# Patient Record
Sex: FEMALE | Race: WHITE | NOT HISPANIC OR LATINO | Employment: PART TIME | URBAN - METROPOLITAN AREA
[De-identification: names, ages, dates, MRNs, and addresses within clinical notes are randomized per-mention and may not be internally consistent; named-entity substitution may affect disease eponyms.]

---

## 2017-05-09 ENCOUNTER — APPOINTMENT (EMERGENCY)
Dept: RADIOLOGY | Facility: HOSPITAL | Age: 52
End: 2017-05-09
Payer: COMMERCIAL

## 2017-05-09 ENCOUNTER — HOSPITAL ENCOUNTER (EMERGENCY)
Facility: HOSPITAL | Age: 52
Discharge: HOME | End: 2017-05-09
Admitting: EMERGENCY MEDICINE
Payer: COMMERCIAL

## 2017-05-09 VITALS
SYSTOLIC BLOOD PRESSURE: 117 MMHG | WEIGHT: 142 LBS | RESPIRATION RATE: 16 BRPM | HEIGHT: 65 IN | OXYGEN SATURATION: 98 % | BODY MASS INDEX: 23.66 KG/M2 | HEART RATE: 73 BPM | DIASTOLIC BLOOD PRESSURE: 61 MMHG | TEMPERATURE: 97.9 F

## 2017-05-09 DIAGNOSIS — M94.0 COSTOCHONDRITIS: Primary | ICD-10-CM

## 2017-05-09 PROCEDURE — 99283 EMERGENCY DEPT VISIT LOW MDM: CPT

## 2017-05-09 PROCEDURE — 71020 HB CHEST X-RAY 2VW FRONTAL&LATL: CPT

## 2017-05-09 PROCEDURE — 93005 ELECTROCARDIOGRAM TRACING: CPT | Performed by: PHYSICIAN ASSISTANT

## 2017-05-09 RX ORDER — BUPROPION HYDROCHLORIDE 300 MG/1
300 TABLET ORAL DAILY
COMMUNITY

## 2017-05-09 RX ORDER — ALPRAZOLAM 0.25 MG/1
0.25 TABLET ORAL
COMMUNITY

## 2017-05-09 RX ORDER — PREDNISONE 20 MG/1
60 TABLET ORAL ONCE
Status: COMPLETED | OUTPATIENT
Start: 2017-05-09 | End: 2017-05-09

## 2017-05-09 RX ORDER — PREDNISONE 20 MG/1
40 TABLET ORAL DAILY
Qty: 8 TABLET | Refills: 0 | Status: SHIPPED | OUTPATIENT
Start: 2017-05-09 | End: 2017-05-13

## 2017-05-09 RX ADMIN — PREDNISONE 60 MG: 20 TABLET ORAL at 11:10

## 2017-05-10 LAB
ATRIAL RATE: 68 BPM
P AXIS: 58 DEGREES
PR INTERVAL: 156 MS
QRS AXIS: 60 DEGREES
QRSD INTERVAL: 90 MS
QT INTERVAL: 392 MS
QTC INTERVAL: 416 MS
T WAVE AXIS: 48 DEGREES
VENTRICULAR RATE: 68 BPM

## 2018-09-07 ENCOUNTER — OFFICE VISIT (OUTPATIENT)
Dept: OBGYN CLINIC | Facility: CLINIC | Age: 53
End: 2018-09-07
Payer: COMMERCIAL

## 2018-09-07 ENCOUNTER — APPOINTMENT (OUTPATIENT)
Dept: RADIOLOGY | Facility: CLINIC | Age: 53
End: 2018-09-07
Payer: COMMERCIAL

## 2018-09-07 VITALS
HEART RATE: 77 BPM | SYSTOLIC BLOOD PRESSURE: 107 MMHG | BODY MASS INDEX: 23.29 KG/M2 | DIASTOLIC BLOOD PRESSURE: 64 MMHG | WEIGHT: 139.8 LBS | HEIGHT: 65 IN

## 2018-09-07 DIAGNOSIS — M25.512 LEFT SHOULDER PAIN, UNSPECIFIED CHRONICITY: ICD-10-CM

## 2018-09-07 DIAGNOSIS — S43.402A SPRAIN OF LEFT SHOULDER, UNSPECIFIED SHOULDER SPRAIN TYPE, INITIAL ENCOUNTER: Primary | ICD-10-CM

## 2018-09-07 PROCEDURE — 73030 X-RAY EXAM OF SHOULDER: CPT

## 2018-09-07 PROCEDURE — 99203 OFFICE O/P NEW LOW 30 MIN: CPT | Performed by: ORTHOPAEDIC SURGERY

## 2018-09-07 NOTE — PROGRESS NOTES
Assessment/Plan:  1  Sprain of left shoulder, unspecified shoulder sprain type, initial encounter  XR shoulder 2+ vw left       Scribe Attestation    I,:   Ly Tiwari MA am acting as a scribe while in the presence of the attending physician :        I,:   Melo Rutledge, DO personally performed the services described in this documentation    as scribed in my presence :              Dang's signs and symptoms are consistent with a left shoulder strain  On examination today she has mild pain with 110 degrees of abduction but, otherwise a normal shoulder exam   I discussed with her to take Advil or Tylenol over the counter for any pain or discomfort  She was instructed she can follow up with me as needed for this and to return to the office if her shoulder complaints return since she is asymptomatic in the office today  All of her questions were answered in the office today and she may call the office at any time with any questions or concerns  Subjective:   Yuliya Null is a 48 y o  female who presents for evaluation of left shoulder pain  She states yesterday she was reaching down to  and object and felt a sharp throbbing  pain into her left shoulder she denies a pop at this time  She states she had loss of ROM and was unable to reach above her head  She states she has felt a "twinge" in her left shoulder for a few weeks but no pain until the episode yesterday  She states the pain is located in the anterior aspect of her left shoulder and will radiate towards her neck  She denies any pain complaints today  She denies any numbness or tingling  Review of Systems   Constitutional: Negative for chills and fever  HENT: Negative for drooling and sneezing  Eyes: Negative for redness  Respiratory: Negative for cough and wheezing  Gastrointestinal: Negative for nausea and vomiting  Musculoskeletal: Positive for arthralgias and myalgias  Negative for joint swelling     Neurological: Negative for weakness and numbness  Psychiatric/Behavioral: Negative for behavioral problems  The patient is not nervous/anxious  History reviewed  No pertinent past medical history  History reviewed  No pertinent surgical history  Family History   Problem Relation Age of Onset    No Known Problems Mother     No Known Problems Father     No Known Problems Sister     No Known Problems Brother     No Known Problems Maternal Aunt     No Known Problems Maternal Uncle     No Known Problems Paternal Aunt     No Known Problems Paternal Uncle     No Known Problems Maternal Grandmother     No Known Problems Maternal Grandfather     No Known Problems Paternal Grandmother     No Known Problems Paternal Grandfather     ADD / ADHD Neg Hx     Anesthesia problems Neg Hx     Cancer Neg Hx     Clotting disorder Neg Hx     Collagen disease Neg Hx     Diabetes Neg Hx     Dislocations Neg Hx     Learning disabilities Neg Hx     Neurological problems Neg Hx     Osteoporosis Neg Hx     Rheumatologic disease Neg Hx     Scoliosis Neg Hx     Vascular Disease Neg Hx        Social History     Occupational History    Not on file  Social History Main Topics    Smoking status: Never Smoker    Smokeless tobacco: Never Used    Alcohol use No    Drug use: No    Sexual activity: Not on file         Current Outpatient Prescriptions:     ALPRAZolam (XANAX) 0 25 mg tablet, Take 0 25 mg by mouth daily at bedtime as needed for anxiety, Disp: , Rfl:     buPROPion (WELLBUTRIN XL) 300 mg 24 hr tablet, Take 300 mg by mouth daily, Disp: , Rfl:     Allergies   Allergen Reactions    Naproxen GI Intolerance    Zithromax [Azithromycin] GI Intolerance       Objective:  Vitals:    09/07/18 1305   BP: 107/64   Pulse: 77       Left Shoulder Exam   Left shoulder exam is normal     Tenderness   The patient is experiencing no tenderness           Range of Motion   Active Abduction: 110 (mild pain)     Muscle Strength Abduction: 5/5   Internal Rotation: 5/5   External Rotation: 5/5   Supraspinatus: 5/5     Tests   Cross Arm: positive  Hawkin's test: negative  Impingement: negative    Other   Erythema: absent  Scars: absent  Sensation: normal  Pulse: present     Comments:  Negative belly press  Negative empty can             Physical Exam   Constitutional: She is oriented to person, place, and time  She appears well-developed and well-nourished  HENT:   Head: Atraumatic  Eyes: Conjunctivae are normal    Neck: Normal range of motion  Cardiovascular: Normal rate  Pulmonary/Chest: Effort normal    Musculoskeletal:   As noted in HPI   Neurological: She is alert and oriented to person, place, and time  Skin: Skin is warm and dry  Psychiatric: She has a normal mood and affect  Her behavior is normal  Judgment and thought content normal    Nursing note and vitals reviewed  I have personally reviewed pertinent films in PACS and my interpretation is as follows:X-ray left shoulder performed on 9/7/18 shows normal appearing x-ray no signs of fractures or dislocations     No lytic or blastic lesion, soft tissue is normal as well

## 2022-01-31 ENCOUNTER — HOSPITAL ENCOUNTER (OUTPATIENT)
Dept: RADIOLOGY | Facility: HOSPITAL | Age: 57
Discharge: HOME | End: 2022-01-31
Payer: COMMERCIAL

## 2022-01-31 ENCOUNTER — OFFICE VISIT (OUTPATIENT)
Dept: OBGYN CLINIC | Facility: CLINIC | Age: 57
End: 2022-01-31
Payer: COMMERCIAL

## 2022-01-31 VITALS — WEIGHT: 145 LBS | BODY MASS INDEX: 24.16 KG/M2 | HEIGHT: 65 IN

## 2022-01-31 DIAGNOSIS — M25.521 PAIN IN RIGHT ELBOW: ICD-10-CM

## 2022-01-31 DIAGNOSIS — M25.521 PAIN IN RIGHT ELBOW: Primary | ICD-10-CM

## 2022-01-31 DIAGNOSIS — M77.11 LATERAL EPICONDYLITIS OF RIGHT ELBOW: ICD-10-CM

## 2022-01-31 PROCEDURE — 99203 OFFICE O/P NEW LOW 30 MIN: CPT | Performed by: PHYSICIAN ASSISTANT

## 2022-01-31 PROCEDURE — 73080 X-RAY EXAM OF ELBOW: CPT

## 2022-01-31 NOTE — PATIENT INSTRUCTIONS
Tennis Elbow Exercises   AMBULATORY CARE:   Tennis elbow exercises  help decrease pain in your elbow, forearm, wrist, and hand  They also help strengthen your arm muscles and prevent further injury  Start these exercises slowly  Do the exercises on both arms  Stop if you feel pain  · Finger extensions:  Hold your fingers close together  Put a rubber band around the outside of your fingers and thumb  Spread your fingers apart and then slowly bring them together without letting the rubber band fall off  Repeat 40 times  · Wrist flexor stretch:  Hold your arm straight out in front of you with your palm facing down  Use your other hand to grasp your fingers  Keep your elbow straight and slowly bend your hand back  Your fingertips should point up and your palm should face away from you  Do this until you feel a stretch in the top of your wrist  Hold for 10 seconds  Repeat 5 times  · Wrist extensor stretch: This stretch is the opposite of the wrist flexor stretch  Hold your arm straight out in front of you with your palm facing down  Use your other hand to grasp your fingers  Keep your elbow straight and slowly bend your hand down  Your fingertips should point down and your palm should face you  Do this until you feel a stretch in your wrist  Hold for 10 seconds  Repeat 5 times  · Bicep curls:  Place your hand under your injured elbow for support  Turn your palm so that it faces up and hold a weight in your hand  Ask your healthcare provider how much weight you should use  Slowly bend and straighten your elbow 30 times  · :  Hold a soft rubber ball or tennis ball in your hand  Squeeze the ball as hard as you can and hold this position  Ask your healthcare provider how long to hold this position  Repeat this exercise as directed by your healthcare provider  Contact your healthcare provider if:   · You have increased pain or weakness in your arm, wrist, hand, or fingers      · You have new numbness or tingling in your arm, hand, or fingers  · You have questions or concerns about tennis elbow exercises  © Copyright [employer] 2021 Information is for End User's use only and may not be sold, redistributed or otherwise used for commercial purposes  All illustrations and images included in CareNotes® are the copyrighted property of A D A M , Inc  or Aidan Garner   The above information is an  only  It is not intended as medical advice for individual conditions or treatments  Talk to your doctor, nurse or pharmacist before following any medical regimen to see if it is safe and effective for you  Diclofenac (On the skin)   Diclofenac (dye-KLOE-fen-ak)  Treats actinic keratoses  Also treats pain and swelling caused by arthritis, including osteoarthritis of the knee  This is an NSAID  Brand Name(s): Aspercreme Arthritis Pain Reliever, Diclo Gel, Diclofono, Diclozor, Klofensaid II, Leader Arthritis Pain Reliever, Lexixryl, Pennsaid, Solaravix, Solaraze, Voltaren Gel, Xrylix   There may be other brand names for this medicine  When This Medicine Should Not Be Used: This medicine is not right for everyone  Do not use it if you had an allergic reaction to diclofenac, aspirin or another NSAID medication  How to Use This Medicine:   Gel/Jelly, Liquid  · Use your medicine as directed  There are several brands of this medicine  Make sure you understand how to use the brand you have been prescribed  Ask your doctor if you have any questions  · The diclofenac 1% gel comes with a dosing card to measure the correct dose  If you do not receive or misplace your dosing card, call your pharmacist to ask for a new one  · Voltaren® gel: Follow the instructions on the medicine label if you are using this medicine without a prescription  · Use this medicine only on your skin  Rinse it off right away if it gets on a cut or scrape   Do not get the medicine in your eyes, nose, or mouth   · Wash your hands with soap and water before and after you use this medicine  · Apply a thin layer of the medicine to the affected area  Rub it in gently  · Do not shower, bathe, or wash the affected area for at least 30 minutes after you apply Pennsaid® or Solaraze® or 1 hour after you apply Voltaren®  · Wait until the medicine dries before you cover the treated skin with gloves or clothing  Do not let the treated skin touch any other person's skin until the medicine is completely dry  · Do not use external heat or bandages on the treated skin or joint  · This medicine should come with a Medication Guide  Ask your pharmacist for a copy if you do not have one  · Missed dose: Apply a dose as soon as you can  If it is almost time for your next dose, wait until then and apply a regular dose  Do not apply extra medicine to make up for a missed dose  · Store the medicine in a closed container at room temperature, away from heat, moisture, and direct light  Drugs and Foods to Avoid:   Ask your doctor or pharmacist before using any other medicine, including over-the-counter medicines, vitamins, and herbal products  · Do not use any other NSAID unless your doctor says it is okay  Some other NSAIDs are aspirin, diflunisal, ibuprofen, naproxen, or salsalate  · Some medicines can affect how diclofenac works  Tell your doctor if you are using any of the following:  ? Acetaminophen, cyclosporine, digoxin, lithium, methotrexate, pemetrexed  ? Blood pressure medicine (including ACE inhibitors, ARBs, beta blockers)  ? Blood thinner (including warfarin)  ? Diuretic (water pill)  ? Medicine to treat depression (including SNRIs, SSRIs)  ? Steroids (including dexamethasone, hydrocortisone, methylprednisolone, prednisolone, prednisone)  · Do not put cosmetics or skin care products on the treated skin  You may use sunscreen, insect repellant, lotion, or other topical medicines after using Pennsaid®   However, wait until the medicine is completely dry before you apply anything else  Warnings While Using This Medicine:   · Tell your doctor if you are pregnant  Do not use this medicine during the later part of a pregnancy, unless your doctor tells you to  · Tell your doctor if you are breastfeeding, or if you have kidney disease, liver disease, asthma, bleeding problems, heart failure, high blood pressure, other heart or blood vessel problems, a recent heart attack, or a history of stomach ulcers or bleeding  Tell your doctor if you drink alcohol  · This medicine may cause the following problems:  ? Higher risk of blood clot, heart attack, heart failure, or stroke  ? Stomach or bowel problems (including bleeding, ulcers, or perforation)  ? Liver problems  ? High blood pressure  ? Kidney problems  ? Serious skin reactions, including Hobson-Jasson syndrome, exfoliative dermatitis, toxic epidermal necrolysis, and drug reaction with eosinophilia and systemic symptoms (DRESS)  · This medicine may cause a delay in ovulation for women and may affect their ability to have children  If you plan to have children, talk with your doctor before using this medicine  · This medicine may make your skin more sensitive to sunlight  Wear sunscreen  Do not use sunlamps or tanning beds  · Your doctor will do lab tests at regular visits to check on the effects of this medicine  Keep all appointments  · Keep all medicine out of the reach of children  Never share your medicine with anyone    Possible Side Effects While Using This Medicine:   Call your doctor right away if you notice any of these side effects:  · Allergic reaction: Itching or hives, swelling in your face or hands, swelling or tingling in your mouth or throat, chest tightness, trouble breathing  · Blistering, peeling, or red skin rash  · Bloody or black, tarry stools, severe stomach pain, vomiting blood or material that looks like coffee grounds  · Change in how much or how often you urinate  · Chest pain that may spread to your arms, jaw, back, or neck, trouble breathing, unusual sweating, faintness  · Dark urine or pale stools, nausea, vomiting, loss of appetite, stomach pain, yellow skin or eyes  · Numbness or weakness in your arm or leg, or on one side of your body, pain in your lower leg, sudden or severe headache, or problems with vision, speech, or walking  · Rapid weight gain, swelling in your hands, ankles, or feet  · Unusual bleeding, bruising, or weakness  If you notice these less serious side effects, talk with your doctor:   · Dry, flaky, or scaly skin  · Mild headache  · Mild skin rash, itching, or redness  If you notice other side effects that you think are caused by this medicine, tell your doctor  Call your doctor for medical advice about side effects  You may report side effects to FDA at 2-094-FDA-7494    © Copyright [employer] 2021 Information is for End User's use only and may not be sold, redistributed or otherwise used for commercial purposes  The above information is an  only  It is not intended as medical advice for individual conditions or treatments  Talk to your doctor, nurse or pharmacist before following any medical regimen to see if it is safe and effective for you

## 2022-01-31 NOTE — PROGRESS NOTES
Assessment/Plan   Diagnoses and all orders for this visit:    Pain in right elbow    Lateral epicondylitis of right elbow    - Ice as needed  - Forearm stretches, home program - detailed in AVS  - Voltaren gel as needed  - Tennis elbow strap  - Follow up with [facility] in 4 weeks      Subjective   Patient ID: Beauty Dakins is a 62 y o  female  Vitals:     60yo female comes in for an evaluation of her right elbow  History of RA  She has been having ongoing pain for about 2 weeks with no specific injury  The pain is medial and lateral   It comes on when she tries to pick something up, such a a full drink  Yesterday at [employer] she had to switch hands to drink her drink  The pain resolves completely at rest   The pain is dull in character, mild in severity, pain does not radiate and is not associated with numbness  The following portions of the patient's history were reviewed and updated as appropriate: allergies, current medications, past family history, past medical history, past social history, past surgical history and problem list     Review of Systems  Ortho Exam  History reviewed  No pertinent past medical history  History reviewed  No pertinent surgical history    Family History   Problem Relation Age of Onset    No Known Problems Mother     No Known Problems Father     No Known Problems Sister     No Known Problems Brother     No Known Problems Maternal Aunt     No Known Problems Maternal Uncle     No Known Problems Paternal Aunt     No Known Problems Paternal Uncle     No Known Problems Maternal Grandmother     No Known Problems Maternal Grandfather     No Known Problems Paternal Grandmother     No Known Problems Paternal Grandfather     ADD / ADHD Neg Hx     Anesthesia problems Neg Hx     Cancer Neg Hx     Clotting disorder Neg Hx     Collagen disease Neg Hx     Diabetes Neg Hx     Dislocations Neg Hx     Learning disabilities Neg Hx     Neurological problems Neg Hx     Osteoporosis Neg Hx     Rheumatologic disease Neg Hx     Scoliosis Neg Hx     Vascular Disease Neg Hx      Social History     Occupational History    Not on file   Tobacco Use    Smoking status: Never Smoker    Smokeless tobacco: Never Used   Vaping Use    Vaping Use: Never used   Substance and Sexual Activity    Alcohol use: No    Drug use: No    Sexual activity: Not on file       Review of Systems   Constitutional: Negative  HENT: Negative  Eyes: Negative  Respiratory: Negative  Cardiovascular: Negative  Gastrointestinal: Negative  Endocrine: Negative  Genitourinary: Negative  Musculoskeletal: As below      Allergic/Immunologic: Negative  Neurological: Negative  Hematological: Negative  Psychiatric/Behavioral: Negative  Objective   Physical Exam      I have personally reviewed pertinent films in PACS and my interpretation is no acute displaced fracture on xray  No fat pad sign       · Constitutional: Awake, Alert, Oriented  · Eyes: EOMI  · Psych: Mood and affect appropriate  · Heart: regular rate   · Lungs: No audible wheezing  · Abdomen: No guarding  · Lymph: no lymphedema       right Elbow:  - Appearance   No swelling, discoloration, deformity, or ecchymosis  - Palpation   No tenderness to palpation of the medial / lateral epicondyles, olecranon, radial head, or anterior elbow  No effusion   - ROM   Extension: 0 and Flexion: 140  Supination 90, pronation 90  - Motor   normal 5/5 in all planes  - Special Tests   + pain with resisted wrist extension    No pain with resisted wrist flexion or finger extension   - NVI distally

## 2022-02-15 ENCOUNTER — TELEPHONE (OUTPATIENT)
Dept: OBGYN CLINIC | Facility: OTHER | Age: 57
End: 2022-02-15

## 2022-02-15 NOTE — TELEPHONE ENCOUNTER
Patient called in , she is having increasing Right Elbow pain, she said she cannot use her arm  She said she was never told Discharge instructions os she is not sure what to do        C/b # [phone]

## 2022-02-16 ENCOUNTER — OFFICE VISIT (OUTPATIENT)
Dept: OBGYN CLINIC | Facility: CLINIC | Age: 57
End: 2022-02-16
Payer: COMMERCIAL

## 2022-02-16 VITALS
BODY MASS INDEX: 24.16 KG/M2 | TEMPERATURE: 97.2 F | HEART RATE: 71 BPM | SYSTOLIC BLOOD PRESSURE: 109 MMHG | DIASTOLIC BLOOD PRESSURE: 71 MMHG | WEIGHT: 145 LBS | HEIGHT: 65 IN

## 2022-02-16 DIAGNOSIS — M77.11 LATERAL EPICONDYLITIS OF RIGHT ELBOW: Primary | ICD-10-CM

## 2022-02-16 PROCEDURE — 99214 OFFICE O/P EST MOD 30 MIN: CPT | Performed by: ORTHOPAEDIC SURGERY

## 2022-02-16 RX ORDER — IBUPROFEN 600 MG/1
600 TABLET ORAL 2 TIMES DAILY WITH MEALS
COMMUNITY
Start: 2022-02-02

## 2022-02-16 RX ORDER — ALPRAZOLAM 0.5 MG/1
0.5 TABLET ORAL
COMMUNITY
Start: 2022-01-03

## 2022-02-16 NOTE — PROGRESS NOTES
Assessment/Plan:  1  Lateral epicondylitis of right elbow  Cock Up Wrist Splint     Media Horse has right-sided elbow pain consistent with lateral epicondylitis  We discussed today multiple treatment options including ice, anti-inflammatories, rest, counterforce bracing during activity and cock-up wrist splinting at night  I also recommended home exercises  We could consider formal physical therapy if her pain persists or worsens  I also recommended possibility of a cortisone injection if all of these modalities do not help her  She verbalized understanding this plan will follow-up in 4-6 weeks  Subjective:   Dagmar Tom is a 62 y o  female who presents to the office for evaluation for right-sided elbow pain  She has been feeling increased discomfort in the lateral portion of her right elbow for the past 3 months  She denies any injury or trauma  She denies any new activity or new job that may have increased her pain  She did see orthopedic office in [facility] was diagnosed with lateral epicondylitis  She states she was given a counterforce brace but no other instructions  She has also been using anti-inflammatories but not icing or doing any other exercises  She cannot recall what may have exacerbated this pain  She never felt this in the past   She denies any numbness or tingling down her arm  Today she has aching throbbing pain to the lateral portion of the elbow that worsens with gripping or picking up objects  Review of Systems   Constitutional: Negative for chills, fever and unexpected weight change  HENT: Negative for hearing loss, nosebleeds and sore throat  Eyes: Negative for pain, redness and visual disturbance  Respiratory: Negative for cough, shortness of breath and wheezing  Cardiovascular: Negative for chest pain, palpitations and leg swelling  Gastrointestinal: Negative for abdominal pain, nausea and vomiting  Endocrine: Negative for polydipsia and polyuria     Genitourinary: Negative for dysuria and hematuria  Musculoskeletal:        See HPI   Skin: Negative for rash and wound  Neurological: Negative for dizziness, numbness and headaches  Psychiatric/Behavioral: Negative for decreased concentration and suicidal ideas  The patient is not nervous/anxious  No past medical history on file  No past surgical history on file      Family History   Problem Relation Age of Onset    No Known Problems Mother     No Known Problems Father     No Known Problems Sister     No Known Problems Brother     No Known Problems Maternal Aunt     No Known Problems Maternal Uncle     No Known Problems Paternal Aunt     No Known Problems Paternal Uncle     No Known Problems Maternal Grandmother     No Known Problems Maternal Grandfather     No Known Problems Paternal Grandmother     No Known Problems Paternal Grandfather     ADD / ADHD Neg Hx     Anesthesia problems Neg Hx     Cancer Neg Hx     Clotting disorder Neg Hx     Collagen disease Neg Hx     Diabetes Neg Hx     Dislocations Neg Hx     Learning disabilities Neg Hx     Neurological problems Neg Hx     Osteoporosis Neg Hx     Rheumatologic disease Neg Hx     Scoliosis Neg Hx     Vascular Disease Neg Hx        Social History     Occupational History    Not on file   Tobacco Use    Smoking status: Never Smoker    Smokeless tobacco: Never Used   Vaping Use    Vaping Use: Never used   Substance and Sexual Activity    Alcohol use: No    Drug use: No    Sexual activity: Not on file         Current Outpatient Medications:     ALPRAZolam (XANAX) 0 5 mg tablet, Take 0 5 mg by mouth daily at bedtime, Disp: , Rfl:     buPROPion (WELLBUTRIN XL) 300 mg 24 hr tablet, Take 300 mg by mouth daily, Disp: , Rfl:     ibuprofen (MOTRIN) 600 mg tablet, Take 600 mg by mouth 2 (two) times a day with meals, Disp: , Rfl:     ALPRAZolam (XANAX) 0 25 mg tablet, Take 0 25 mg by mouth daily at bedtime as needed for anxiety (Patient not taking: Reported on 2/16/2022 ), Disp: , Rfl:     Allergies   Allergen Reactions    Naproxen GI Intolerance    Zithromax [Azithromycin] GI Intolerance       Objective:  Vitals:    02/16/22 1028   BP: 109/71   Pulse: 71   Temp: (!) 97 2 °F (36 2 °C)       Right Elbow Exam     Tenderness   The patient is experiencing tenderness in the lateral epicondyle  Range of Motion   Extension: normal   Flexion: normal   Pronation: normal   Supination: normal     Tests   Varus: negative  Valgus: negative    Other   Erythema: absent  Sensation: normal  Pulse: present    Comments:  Pain with resisted wrist extension            Physical Exam  Vitals and nursing note reviewed  Constitutional:       Appearance: She is well-developed  HENT:      Head: Normocephalic and atraumatic  Eyes:      General: No scleral icterus  Conjunctiva/sclera: Conjunctivae normal    Cardiovascular:      Rate and Rhythm: Normal rate  Pulmonary:      Effort: Pulmonary effort is normal  No respiratory distress  Musculoskeletal:      Cervical back: Normal range of motion and neck supple  Comments: As noted in HPI   Skin:     General: Skin is warm and dry  Neurological:      Mental Status: She is alert and oriented to person, place, and time  Psychiatric:         Behavior: Behavior normal          I have personally reviewed pertinent films in PACS and my interpretation is as follows: Three-view x-rays of the right wrist demonstrate no evidence of acute fracture or significant degenerative change

## 2022-04-02 ENCOUNTER — TELEPHONE (OUTPATIENT)
Dept: OBGYN CLINIC | Facility: HOSPITAL | Age: 57
End: 2022-04-02

## 2022-04-02 NOTE — TELEPHONE ENCOUNTER
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

## 2022-04-04 NOTE — TELEPHONE ENCOUNTER
If she is still having pain and everything else has not helped the therapy would be the next appropriate step   Order for PT/OT placed in chart

## 2022-04-11 ENCOUNTER — TELEPHONE (OUTPATIENT)
Dept: OBGYN CLINIC | Facility: CLINIC | Age: 57
End: 2022-04-11

## 2022-04-11 ENCOUNTER — EVALUATION (OUTPATIENT)
Dept: OCCUPATIONAL THERAPY | Facility: CLINIC | Age: 57
End: 2022-04-11
Payer: COMMERCIAL

## 2022-04-11 DIAGNOSIS — M77.11 RIGHT LATERAL EPICONDYLITIS: Primary | ICD-10-CM

## 2022-04-11 PROCEDURE — 97165 OT EVAL LOW COMPLEX 30 MIN: CPT

## 2022-04-11 NOTE — PROGRESS NOTES
OT Evaluation     Today's date: 2022  Patient name: Kary Cid  : 1965  MRN: [id number]  Referring provider: Saroj Lopez DO  Dx:   Encounter Diagnosis     ICD-10-CM    1  Right lateral epicondylitis  M77 11                   Assessment  Assessment details: Patient is a 62 y o  RHD adult who presents for OT IE and treatment for right lateral epicondylitis  Patient reports no LALY  Patient reports she began to get pain about 2 months ago  Patient notes the pain is mostly induced with twisting, lifting, and writing  Patient referred by Dr Shelly Salas to initiate treatment including hand therapy  Impairments: abnormal or restricted ROM, impaired physical strength, lacks appropriate home exercise program and pain with function  Understanding of Dx/Px/POC: good   Prognosis: good    Goals  Short term goals 2-4 weeks  Establish HEP to enhance performance with ADLs  Improve active range of motion of RUE by 20  to assist with UE dressing  Achieve composite digital flexion to touch distal leigh crease for grasp on utensils  In crease  strength by 10 lbs  to enhance gripping hand tools  Long Term goals by discharge  Establish final home exercise program to enhance maximal functional level with ADLs  Achieve functional active range of motion of RUE for full return to household chores  Achieve functional strength of RUE for full return to high level ADLs            Plan  Patient would benefit from: OT eval and skilled occupational therapy  Planned modality interventions: thermotherapy: hydrocollator packs and cryotherapy  Planned therapy interventions: manual therapy, joint mobilization, strengthening, stretching, therapeutic activities, therapeutic exercise, home exercise program, graded exercise, graded activity, functional ROM exercises and flexibility  Frequency: 2x week  Duration in weeks: 10  Plan of Care beginning date: 2022  Plan of Care expiration date: 2022  Treatment plan discussed with: patient        Subjective Evaluation    History of Present Illness  Mechanism of injury: Patient is a 62 y o  RHD adult who presents for OT IE and treatment for right lateral epicondylitis  Patient reports no LALY  Patient reports she began to get pain about 2 months ago  Patient notes the pain is mostly induced with twisting, lifting, and writing  Patient referred by Dr Citlalli Alarcon to initiate treatment including hand therapy  Pain  Current pain ratin  At best pain ratin  At worst pain ratin  Quality: throbbing, tight and dull ache    Patient Goals  Patient goals for therapy: decreased pain, increased motion, increased strength, independence with ADLs/IADLs and return to work          Objective     Active Range of Motion     Left Wrist   Wrist flexion: 85 degrees   Wrist extension: 78 degrees   Radial deviation: 27 degrees   Ulnar deviation: 26 degrees     Right Wrist   Wrist flexion: 70 degrees   Wrist extension: 76 degrees   Radial deviation: 27 degrees   Ulnar deviation: 30 degrees     Strength/Myotome Testing     Left Wrist/Hand   Wrist extension: 4+     (2nd hand position)     Trial 1: 45    Trial 2: 40    Right Wrist/Hand   Wrist extension: 3     (2nd hand position)     Trial 1: 10    Trial 2: 5      Flowsheet Rows      Most Recent Value   PT/OT G-Codes    Current Score 49   Projected Score 69             Precautions:        Manuals HEP                        Ther Ex     Education on HEP and dx x5 min               STM x5 min     Active stretches wrist flex x3 10 sec    PROM elbow ext x3 10 sec    PROM wrist flex/ext x3 10 sec                                  Modalities     MHP 5 min            Assessment:   Patient tolerated session well  Patient demonstrates ROM and strength deficits upon assessment today   Patient session focused on patient education on anatomy and physiology concerning current dx, techniques for decreasing deficits through HEP, and appropriate use of modalities  Patient educated on HEP to include modalities, STM, and self stretches with verbal instructions and handouts for patient reference  Patient educated on treatment plan at this time  Patient benefiting from skilled hand therapy OT to reduce deficits to improve independence with daily activities      Plan:   Focus on ROM and pain reduction techniques to improve ability to complete daily activites with ease    POC 4/11/22 - 6/6/22

## 2022-04-11 NOTE — TELEPHONE ENCOUNTER
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

## 2022-04-12 NOTE — TELEPHONE ENCOUNTER
Patient checking on status  She states , once she knows the Dr is willing to do this, her HR will give her a form to fax to you [place]

## 2022-04-14 ENCOUNTER — OFFICE VISIT (OUTPATIENT)
Dept: OCCUPATIONAL THERAPY | Facility: CLINIC | Age: 57
End: 2022-04-14
Payer: COMMERCIAL

## 2022-04-14 DIAGNOSIS — M77.11 RIGHT LATERAL EPICONDYLITIS: Primary | ICD-10-CM

## 2022-04-14 PROCEDURE — 97032 APPL MODALITY 1+ESTIM EA 15: CPT

## 2022-04-14 PROCEDURE — 97110 THERAPEUTIC EXERCISES: CPT

## 2022-04-14 NOTE — PROGRESS NOTES
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  She reported that she can't write with a pen, button her pants, or dry her hair  She has constant pain/discomfort that is impacting her ability to successfully engage in ADLs/IADLs  Precautions:        Manuals HEP 2022                       Ther Ex     Education on HEP and dx x5 min  5 mins              STM/IASTM x5 min  10 min   Active stretches wrist flex x3 10 sec x3 10 sec   PROM elbow ext x3 10 sec x3 10 sec   PROM wrist flex/ext x3 10 sec x3 10 sec                                  Modalities     MHP 5 min  5 minutes    E-stim   10 minutes      Assessment:   Patient tolerated session well  Session focused on modalities and TE to improve ROM required for engagement in ADLs/IADLs  Patient was encouraged to incorporate modalities, STM, and self stretches into the HEP to decrease pain/discomfort and reach goals  Patient is continuing to be challenged through the use of TE to make improvements  Completed session with no reported increased pain due to exercise activities  Patient benefiting from skilled hand therapy OT to reduce deficits to improve independence with daily activities  Plan:   Focus on ROM and pain reduction techniques to improve ability to complete daily activites with ease    POC 22 - 22

## 2022-04-14 NOTE — TELEPHONE ENCOUNTER
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

## 2022-04-18 ENCOUNTER — OFFICE VISIT (OUTPATIENT)
Dept: OCCUPATIONAL THERAPY | Facility: CLINIC | Age: 57
End: 2022-04-18
Payer: COMMERCIAL

## 2022-04-18 DIAGNOSIS — M77.11 RIGHT LATERAL EPICONDYLITIS: Primary | ICD-10-CM

## 2022-04-18 PROCEDURE — 97110 THERAPEUTIC EXERCISES: CPT

## 2022-04-18 NOTE — PROGRESS NOTES
Daily Note     Today's date: 2022  Patient name: Dexter Oliver  : 1965  MRN: [id number]  Referring provider: Nan Lin DO  Dx:   Encounter Diagnosis     ICD-10-CM    1  Right lateral epicondylitis  M77 11                   Subjective: Patient notes with therapy it has been improving a little bit  Precautions:        Manuals HEP 2022                        Ther Ex     Education on HEP and dx x5 min  5 mins              STM/IASTM x5 min  10 min   Active stretches wrist flex x3 10 sec x3 10 sec   PROM elbow ext x3 10 sec x3 10 sec   PROM wrist flex/ext x3 10 sec x3 10 sec    PREs wrist ext and RD  x20 2 lbs                             Modalities     MHP 5 min  5 minutes    E-stim   10 minutes      Assessment:   Patient tolerated session well  Session focused on modalities and TE to improve ROM required for engagement in ADLs/IADLs  Patient was encouraged to incorporate modalities, STM, and self stretches into the HEP to decrease pain/discomfort and reach goals  Patient is continuing to be challenged through the use of TE to make improvements  Completed session with no reported increased pain due to exercise activities  Patient benefiting from skilled hand therapy OT to reduce deficits to improve independence with daily activities  Plan:   Focus on ROM and pain reduction techniques to improve ability to complete daily activites with ease    POC 22 - 22

## 2022-04-19 ENCOUNTER — APPOINTMENT (OUTPATIENT)
Dept: OCCUPATIONAL THERAPY | Facility: CLINIC | Age: 57
End: 2022-04-19
Payer: COMMERCIAL

## 2022-04-20 ENCOUNTER — APPOINTMENT (OUTPATIENT)
Dept: OCCUPATIONAL THERAPY | Facility: CLINIC | Age: 57
End: 2022-04-20
Payer: COMMERCIAL

## 2022-04-27 ENCOUNTER — OFFICE VISIT (OUTPATIENT)
Dept: OCCUPATIONAL THERAPY | Facility: CLINIC | Age: 57
End: 2022-04-27
Payer: COMMERCIAL

## 2022-04-27 DIAGNOSIS — M77.11 RIGHT LATERAL EPICONDYLITIS: Primary | ICD-10-CM

## 2022-04-27 PROCEDURE — 97110 THERAPEUTIC EXERCISES: CPT

## 2022-04-27 PROCEDURE — 97032 APPL MODALITY 1+ESTIM EA 15: CPT

## 2022-04-27 NOTE — PROGRESS NOTES
Daily Note     Today's date: 2022  Patient name: Waldo Rivera  : 1965  MRN: [id number]  Referring provider: Lamar Eng DO  Dx:   Encounter Diagnosis     ICD-10-CM    1  Right lateral epicondylitis  M77 11        Start Time: 1640  Stop Time: 1730  Total time in clinic (min): 50 minutes    Subjective: Patient reported that she has decreased pain/discomfort and that it seems to be improving  She also reported that she was on family leave two weeks which gave her the ability and time to rest her arm  She also reported that she has pain/stiffness in the morning when she wakes up  She also has been taking less ibuprofen than she usually has been taking which is an improvement  Precautions:        Manuals HEP 2022                        Ther Ex     Education on HEP and dx x5 min  5 mins              STM/IASTM x5 min  10 min   Active stretches wrist flex x3 10 sec x5 10 sec   PROM elbow ext x3 10 sec x5 10 sec   PROM wrist flex/ext x3 10 sec x5 10 sec    PREs wrist ext and RD  x20 2 lbs                             Modalities     MHP 5 min  5 minutes    E-stim   10 minutes      Assessment:   Patient tolerated session well  Session focused on modalities and TE to improve ROM required for engagement in ADLs/IADLs  Patient is compliant with the HEP as she reported that she utilizes heat and incorporates the stretches/exercises into her daily routine  Patient is continuing to be challenged through the use of TE to make improvements  Patient benefiting from skilled hand therapy OT to reduce deficits to improve independence with daily activities  Plan:   Focus on ROM and pain reduction techniques to improve ability to complete daily activites with ease    POC 22 - 22

## 2022-04-29 ENCOUNTER — APPOINTMENT (OUTPATIENT)
Dept: OCCUPATIONAL THERAPY | Facility: CLINIC | Age: 57
End: 2022-04-29
Payer: COMMERCIAL

## 2022-05-02 ENCOUNTER — OFFICE VISIT (OUTPATIENT)
Dept: OCCUPATIONAL THERAPY | Facility: CLINIC | Age: 57
End: 2022-05-02
Payer: COMMERCIAL

## 2022-05-02 DIAGNOSIS — M77.11 RIGHT LATERAL EPICONDYLITIS: Primary | ICD-10-CM

## 2022-05-02 PROCEDURE — 97110 THERAPEUTIC EXERCISES: CPT

## 2022-05-02 NOTE — PROGRESS NOTES
Daily Note     Today's date: 2022  Patient name: Sixto Domínguez  : 1965  MRN: [id number]  Referring provider: Scott Xiong DO  Dx:   Encounter Diagnosis     ICD-10-CM    1  Right lateral epicondylitis  M77 11        Start Time: 1330  Stop Time: 1415  Total time in clinic (min): 45 minutes    Subjective: Patient reported that shes currently out of work  Patient notes the pain has been improving  Precautions:        Manuals HEP 2022                        Ther Ex     Education on HEP and dx x5 min  5 mins              STM/IASTM x5 min  10 min   Active stretches wrist flex x3 10 sec x5 10 sec   PROM elbow ext x3 10 sec x5 10 sec   PROM wrist flex/ext x3 10 sec x5 10 sec    PREs wrist ext and RD  x20 2 lbs    Flex bar   x20 Green                        Modalities     MHP 5 min  5 minutes    E-stim   10 minutes      Assessment:   Patient tolerated session well  Session focused on modalities and TE to improve ROM and reduction in pain required for engagement in ADLs/IADLs  Patient is continuing to be challenged with treatment plan at this time  Patient tolerated and completed added TE on this date with no reported pain/discomfort  Flex bar was added to improve strength through resisted supination  Patient benefiting from skilled hand therapy OT to reduce deficits to improve independence with daily activities  Plan:   Focus on ROM, muscular endurance,  and pain reduction techniques to improve ability to complete daily activites with ease    POC 22 - 22

## 2022-05-05 ENCOUNTER — OFFICE VISIT (OUTPATIENT)
Dept: OCCUPATIONAL THERAPY | Facility: CLINIC | Age: 57
End: 2022-05-05
Payer: COMMERCIAL

## 2022-05-05 DIAGNOSIS — M77.11 RIGHT LATERAL EPICONDYLITIS: Primary | ICD-10-CM

## 2022-05-05 PROCEDURE — 97110 THERAPEUTIC EXERCISES: CPT

## 2022-05-05 NOTE — PROGRESS NOTES
Daily Note     Today's date: 2022  Patient name: Sixto Domínguez  : 1965  MRN: [id number]  Referring provider: Scott Xiong DO  Dx:   Encounter Diagnosis     ICD-10-CM    1  Right lateral epicondylitis  M77 11                   Subjective:  Patient notes the pain has been improving  Precautions:        Manuals HEP 2022                        Ther Ex     Education on HEP and dx x5 min  5 mins              STM/IASTM x5 min  10 min   Active stretches wrist flex x3 10 sec x5 10 sec   PROM elbow ext x3 10 sec x5 10 sec   PROM wrist flex/ext x3 10 sec x5 10 sec    PREs wrist ext and RD  x20 2 lbs    Flex bar   x20 Green    theraband triceps  x20 yellow                  Modalities     MHP 5 min  5 minutes    E-stim   10 minutes      Assessment:   Patient tolerated session well  Session focused on modalities and TE to improve ROM and reduction in pain required for engagement in ADLs/IADLs  Patient is continuing to be challenged with treatment plan at this time  Patient tolerated added TE this date with no reports of increased pain  Patient benefiting from skilled hand therapy OT to reduce deficits to improve independence with daily activities  Plan:   Focus on ROM, muscular endurance,  and pain reduction techniques to improve ability to complete daily activites with ease    POC 22 - 22

## 2022-05-09 ENCOUNTER — APPOINTMENT (OUTPATIENT)
Dept: OCCUPATIONAL THERAPY | Facility: CLINIC | Age: 57
End: 2022-05-09
Payer: COMMERCIAL

## 2022-05-10 ENCOUNTER — OFFICE VISIT (OUTPATIENT)
Dept: OCCUPATIONAL THERAPY | Facility: CLINIC | Age: 57
End: 2022-05-10
Payer: COMMERCIAL

## 2022-05-10 DIAGNOSIS — M77.11 RIGHT LATERAL EPICONDYLITIS: Primary | ICD-10-CM

## 2022-05-10 PROCEDURE — 97110 THERAPEUTIC EXERCISES: CPT

## 2022-05-10 NOTE — PROGRESS NOTES
Daily Note     Today's date: 5/10/2022  Patient name: Jose Martin Don  : 1965  MRN: [id number]  Referring provider: Rodrigo Brower DO  Dx:   Encounter Diagnosis     ICD-10-CM    1  Right lateral epicondylitis  M77 11                   Subjective:  Patient notes the pain continues to improve  Precautions:        Manuals HEP 5/10/2022                        Ther Ex     Education on HEP and dx x5 min  5 mins              STM/IASTM x5 min  10 min   Active stretches wrist flex x3 10 sec x5 10 sec   PROM elbow ext x3 10 sec x5 10 sec   PROM wrist flex/ext x3 10 sec x5 10 sec    PREs wrist ext and RD  x20 2 lbs    Flex bar   x20 Green    theraband triceps  x20 green   gripper  x15 level 3              Modalities     MHP 5 min  5 minutes    E-stim   10 minutes      Assessment:   Patient tolerated session well  Session focused on modalities and TE to improve ROM and reduction in pain required for engagement in ADLs/IADLs  Patient tolerated added TE this date with no reports of increased pain  Patient benefiting from skilled hand therapy OT to reduce deficits to improve independence with daily activities  Plan:   Focus on ROM, muscular endurance,  and pain reduction techniques to improve ability to complete daily activites with ease    POC 22 - 22

## 2022-05-12 ENCOUNTER — APPOINTMENT (OUTPATIENT)
Dept: OCCUPATIONAL THERAPY | Facility: CLINIC | Age: 57
End: 2022-05-12
Payer: COMMERCIAL

## 2022-05-13 ENCOUNTER — OFFICE VISIT (OUTPATIENT)
Dept: OCCUPATIONAL THERAPY | Facility: CLINIC | Age: 57
End: 2022-05-13
Payer: COMMERCIAL

## 2022-05-13 DIAGNOSIS — M77.11 RIGHT LATERAL EPICONDYLITIS: Primary | ICD-10-CM

## 2022-05-13 PROCEDURE — 97110 THERAPEUTIC EXERCISES: CPT

## 2022-05-13 NOTE — PROGRESS NOTES
Daily Note     Today's date: 2022  Patient name: Moriah Valles  : 1965  MRN: [id number]  Referring provider: Vanda Blackwood DO  Dx:   Encounter Diagnosis     ICD-10-CM    1  Right lateral epicondylitis  M77 11                   Subjective:  Patient notes she is back to work  Precautions:        Manuals HEP 2022                        Ther Ex     Education on HEP and dx x5 min  5 mins              STM/IASTM x5 min  10 min   Active stretches wrist flex x3 10 sec x5 10 sec   PROM elbow ext x3 10 sec x5 10 sec   PROM wrist flex/ext x3 10 sec x5 10 sec    PREs wrist ext and RD  x15 3 lbs    Flex bar   x20 Green    theraband triceps  x20 green   gripper  x15 level 3              Modalities     MHP 5 min  5 minutes    E-stim   10 minutes      Assessment:   Patient tolerated session well  Session focused on modalities and TE to improve ROM and reduction in pain required for engagement in ADLs/IADLs  Patient tolerated and completed added resistance on this date with no reported changes  Patient benefiting from skilled hand therapy OT to reduce deficits to improve independence with daily activities  Plan:   Focus on ROM, muscular endurance,  and pain reduction techniques to improve ability to complete daily activites with ease    POC 22 - 22

## 2022-05-16 ENCOUNTER — OFFICE VISIT (OUTPATIENT)
Dept: OCCUPATIONAL THERAPY | Facility: CLINIC | Age: 57
End: 2022-05-16
Payer: COMMERCIAL

## 2022-05-16 DIAGNOSIS — M77.11 RIGHT LATERAL EPICONDYLITIS: Primary | ICD-10-CM

## 2022-05-16 PROCEDURE — 97110 THERAPEUTIC EXERCISES: CPT

## 2022-05-16 NOTE — PROGRESS NOTES
Daily Note     Today's date: 2022  Patient name: Sixto Domínguez  : 1965  MRN: [id number]  Referring provider: Scott Xiong DO  Dx:   Encounter Diagnosis     ICD-10-CM    1  Right lateral epicondylitis  M77 11                   Subjective:  Patient offers no functional changes  Precautions:        Manuals HEP 2022                        Ther Ex     Education on HEP and dx x5 min  5 mins              STM/IASTM x5 min  10 min   Active stretches wrist flex x3 10 sec x5 10 sec   PROM elbow ext x3 10 sec x5 10 sec   PROM wrist flex/ext x3 10 sec x5 10 sec    PREs wrist ext and RD  x15 3 lbs    Flex bar   x20 Green    theraband triceps  x20 black   gripper  x15 level 3              Modalities     MHP 5 min  5 minutes    E-stim   10 minutes      Assessment:   Patient tolerated session well  Session focused on modalities and TE to improve ROM and reduction in pain required for engagement in ADLs/IADLs  Patient tolerated and completed added resistance on this date with no reported changes  Patient benefiting from skilled hand therapy OT to reduce deficits to improve independence with daily activities  Plan:   Focus on ROM, muscular endurance,  and pain reduction techniques to improve ability to complete daily activites with ease    POC 22 - 22

## 2022-05-18 ENCOUNTER — OFFICE VISIT (OUTPATIENT)
Dept: OCCUPATIONAL THERAPY | Facility: CLINIC | Age: 57
End: 2022-05-18
Payer: COMMERCIAL

## 2022-05-18 DIAGNOSIS — M77.11 RIGHT LATERAL EPICONDYLITIS: Primary | ICD-10-CM

## 2022-05-18 PROCEDURE — 97110 THERAPEUTIC EXERCISES: CPT

## 2022-05-18 NOTE — PROGRESS NOTES
Daily Note     Today's date: 2022  Patient name: Ivy Negron  : 1965  MRN: [id number]  Referring provider: Nona Law DO  Dx:   Encounter Diagnosis     ICD-10-CM    1  Right lateral epicondylitis  M77 11                   Subjective:  Patient offers no functional changes  Precautions:        Manuals HEP 2022                        Ther Ex     Education on HEP and dx x5 min  5 mins              STM/IASTM x5 min  10 min   Active stretches wrist flex x3 10 sec x5 10 sec   PROM elbow ext x3 10 sec x5 10 sec   PROM wrist flex/ext x3 10 sec x5 10 sec    PREs wrist ext and RD  x15 3 lbs    Flex bar   x20 Green    theraband triceps  x20 black   gripper  x15 level 3              Modalities     MHP 5 min  5 minutes    E-stim   10 minutes      Assessment:   Patient tolerated session well  Session focused on modalities and TE to improve ROM and reduction in pain required for engagement in ADLs/IADLs  Patient tolerated and completed added resistance on this date with no reported changes  Patient benefiting from skilled hand therapy OT to reduce deficits to improve independence with daily activities  Plan:   Focus on ROM, muscular endurance,  and pain reduction techniques to improve ability to complete daily activites with ease    POC 22 - 22

## 2022-05-31 ENCOUNTER — APPOINTMENT (OUTPATIENT)
Dept: OCCUPATIONAL THERAPY | Facility: CLINIC | Age: 57
End: 2022-05-31
Payer: COMMERCIAL

## 2022-06-01 ENCOUNTER — TELEPHONE (OUTPATIENT)
Dept: OBGYN CLINIC | Facility: MEDICAL CENTER | Age: 57
End: 2022-06-01

## 2022-06-01 ENCOUNTER — OFFICE VISIT (OUTPATIENT)
Dept: OCCUPATIONAL THERAPY | Facility: CLINIC | Age: 57
End: 2022-06-01
Payer: COMMERCIAL

## 2022-06-01 DIAGNOSIS — M77.11 LATERAL EPICONDYLITIS OF RIGHT ELBOW: Primary | ICD-10-CM

## 2022-06-01 DIAGNOSIS — M77.11 RIGHT LATERAL EPICONDYLITIS: Primary | ICD-10-CM

## 2022-06-01 PROCEDURE — 97110 THERAPEUTIC EXERCISES: CPT

## 2022-06-01 NOTE — TELEPHONE ENCOUNTER
Patient sees Dr Anastacia Dominguez  Patient is calling for a new script, her old one runs out  And she would to continue to go for another week  Please give her a call once the script is ready       # [phone]

## 2022-06-01 NOTE — PROGRESS NOTES
OT Re-Evaluation     Today's date: 2022  Patient name: Isha Hadley  : 1965  MRN: [id number]  Referring provider: Marcelo Luis DO  Dx:   Encounter Diagnosis     ICD-10-CM    1  Right lateral epicondylitis  M77 11        Start Time: 930  Stop Time:   Total time in clinic (min): 55 minutes    Assessment  Assessment details: Patient re-eval this date with noted improvement in AROM, strength, and pain reduction at this time  Patient continues to demonstrate mild deficits that are improving with current treatment plan  Patient would benefit from continued skilled OT at this time  Patient is a 62 y o  RHD adult who presents for OT IE and treatment for right lateral epicondylitis  Patient reports no LALY  Patient reports she began to get pain about 2 months ago  Patient notes the pain is mostly induced with twisting, lifting, and writing  Patient referred by Dr Rich Benson to initiate treatment including hand therapy  Impairments: abnormal or restricted ROM, impaired physical strength, lacks appropriate home exercise program and pain with function  Understanding of Dx/Px/POC: good   Prognosis: good    Goals  Short term goals 2-4 weeks  Establish HEP to enhance performance with ADLs  MET    Improve active range of motion of RUE by 20  to assist with UE dressing  MET    Achieve composite digital flexion to touch distal leigh crease for grasp on utensils  MET    In crease  strength by 10 lbs  to enhance gripping hand tools  MET    Increase strength in arm by 30 lbs to improve ability to return to PLOF    Patient to improve ability     Long Term goals by discharge  Establish final home exercise program to enhance maximal functional level with ADLs  Partially met    Achieve functional active range of motion of RUE for full return to household chores  Partially met      Achieve functional strength of RUE for full return to high level ADLs   Partially met          Plan  Patient would benefit from: OT eval and skilled occupational therapy  Planned modality interventions: thermotherapy: hydrocollator packs and cryotherapy  Planned therapy interventions: manual therapy, joint mobilization, strengthening, stretching, therapeutic activities, therapeutic exercise, home exercise program, graded exercise, graded activity, functional ROM exercises and flexibility  Frequency: 2x week  Duration in weeks: 6  Plan of Care beginning date: 22  Plan of Care expiration date: 22  Treatment plan discussed with: patient        Subjective Evaluation    History of Present Illness  Mechanism of injury: Patient is a 62 y o  RHD adult who presents for OT IE and treatment for right lateral epicondylitis  Patient reports no LALY  Patient reports she began to get pain about 2 months ago  Patient notes the pain is mostly induced with twisting, lifting, and writing  Patient referred by Dr Carlota Kaplan to initiate treatment including hand therapy      Pain  Current pain ratin  At best pain ratin  At worst pain ratin  Quality: throbbing, tight and dull ache    Patient Goals  Patient goals for therapy: decreased pain, increased motion, increased strength, independence with ADLs/IADLs and return to work          Objective     Active Range of Motion     Left Wrist   Wrist flexion: 85 degrees   Wrist extension: 78 degrees   Radial deviation: 27 degrees   Ulnar deviation: 26 degrees     Right Wrist   Wrist flexion: 80 degrees   Wrist extension: 76 degrees   Radial deviation: 27 degrees   Ulnar deviation: 30 degrees     Strength/Myotome Testing     Left Wrist/Hand   Wrist extension: 4+     (2nd hand position)     Trial 1: 45    Trial 2: 40    Right Wrist/Hand   Wrist extension: 4     (2nd hand position)     Trial 1: 25    Trial 2: 23                    Daily Note     Today's date: 2022  Patient name: Joselyn Ferrari  : 1965  MRN: [id number]  Referring provider: Skinny Pearce DO  Dx:   Encounter Diagnosis ICD-10-CM    1  Right lateral epicondylitis  M77 11                   Subjective:  Patient offers no functional changes  Precautions:        Manuals HEP 6/1/2022                        Ther Ex     Education on HEP and dx x5 min  5 mins              STM/IASTM x5 min  10 min   Active stretches wrist flex x3 10 sec x5 10 sec   PROM elbow ext x3 10 sec x5 10 sec   PROM wrist flex/ext x3 10 sec x5 10 sec    PREs wrist ext and RD  x15 3 lbs    Flex bar   x20 Green    theraband triceps  x20 black   gripper  x15 level 3              Modalities     MHP 5 min  5 minutes    E-stim   10 minutes      Assessment:   Patient tolerated session well  Session focused on modalities and TE to improve ROM and reduction in pain required for engagement in ADLs/IADLs  Patient re-eval this date with noted improvement towards goals at this time  Patient benefiting from skilled hand therapy OT to reduce deficits to improve independence with daily activities  Plan:   Focus on ROM, muscular endurance,  and pain reduction techniques to improve ability to complete daily activites with ease    POC 6/1/22 - 7/13/22

## 2022-06-02 ENCOUNTER — APPOINTMENT (OUTPATIENT)
Dept: OCCUPATIONAL THERAPY | Facility: CLINIC | Age: 57
End: 2022-06-02
Payer: COMMERCIAL

## 2022-06-03 ENCOUNTER — OFFICE VISIT (OUTPATIENT)
Dept: OCCUPATIONAL THERAPY | Facility: CLINIC | Age: 57
End: 2022-06-03
Payer: COMMERCIAL

## 2022-06-03 DIAGNOSIS — M77.11 RIGHT LATERAL EPICONDYLITIS: Primary | ICD-10-CM

## 2022-06-03 PROCEDURE — 97110 THERAPEUTIC EXERCISES: CPT

## 2022-06-03 NOTE — PROGRESS NOTES
Daily Note     Today's date: 6/3/2022  Patient name: Kanwal Anna  : 1965  MRN: [id number]  Referring provider: Katrin Johnston DO  Dx:   Encounter Diagnosis     ICD-10-CM    1  Right lateral epicondylitis  M77 11                   Subjective:  Patient offers no functional changes  Precautions:        Manuals HEP 6/3/2022                        Ther Ex     Education on HEP and dx x5 min  5 mins              STM/IASTM x5 min  10 min   Active stretches wrist flex x3 10 sec x5 10 sec   PROM elbow ext x3 10 sec x5 10 sec   PROM wrist flex/ext x3 10 sec x5 10 sec    PREs wrist ext and RD  x15 3 lbs    Flex bar   x20 Green    theraband triceps  x20 black   gripper  x15 level 3    Digit ext band  x15 red         Modalities     MHP 5 min  5 minutes    E-stim   10 minutes      Assessment:   Patient tolerated session well  Session focused on modalities and TE to improve ROM and reduction in pain required for engagement in ADLs/IADLs  Patient benefiting from skilled hand therapy OT to reduce deficits to improve independence with daily activities  Plan:   Focus on ROM, muscular endurance,  and pain reduction techniques to improve ability to complete daily activites with ease    POC 22 - 22

## 2022-06-07 ENCOUNTER — APPOINTMENT (OUTPATIENT)
Dept: OCCUPATIONAL THERAPY | Facility: CLINIC | Age: 57
End: 2022-06-07
Payer: COMMERCIAL

## 2022-06-09 ENCOUNTER — OFFICE VISIT (OUTPATIENT)
Dept: OCCUPATIONAL THERAPY | Facility: CLINIC | Age: 57
End: 2022-06-09
Payer: COMMERCIAL

## 2022-06-09 DIAGNOSIS — M77.11 RIGHT LATERAL EPICONDYLITIS: Primary | ICD-10-CM

## 2022-06-09 PROCEDURE — 97110 THERAPEUTIC EXERCISES: CPT

## 2022-06-09 NOTE — PROGRESS NOTES
Daily Note     Today's date: 2022  Patient name: Moriah Valles  : 1965  MRN: [id number]  Referring provider: Vanda Blackwood DO  Dx:   Encounter Diagnosis     ICD-10-CM    1  Right lateral epicondylitis  M77 11                   Subjective:  Patient offers no functional changes  Precautions:        Manuals HEP 2022                        Ther Ex     Education on HEP and dx x5 min  5 mins              STM/IASTM x5 min  10 min   Active stretches wrist flex x3 10 sec x5 10 sec   PROM elbow ext x3 10 sec x5 10 sec   PROM wrist flex/ext x3 10 sec x5 10 sec    PREs wrist ext and RD  x15 3 lbs    Flex bar   x20 Green    theraband triceps  x20 black   gripper  x15 level 3    Digit ext band  x15 red         Modalities     MHP 5 min  5 minutes    E-stim   10 minutes      Assessment:   Patient tolerated session well  Session focused on modalities and TE to improve ROM and pain reduction in pain required for engagement in ADLs/IADLs  Patient benefiting from skilled hand therapy OT to reduce deficits to improve independence with daily activities  Plan:   Focus on ROM, muscular endurance,  and pain reduction techniques to improve ability to complete daily activites with ease    POC 22 - 22

## 2023-12-25 ENCOUNTER — HOSPITAL ENCOUNTER (EMERGENCY)
Facility: HOSPITAL | Age: 58
Discharge: HOME | End: 2023-12-25
Attending: EMERGENCY MEDICINE
Payer: COMMERCIAL

## 2023-12-25 VITALS
HEART RATE: 78 BPM | TEMPERATURE: 97.7 F | DIASTOLIC BLOOD PRESSURE: 89 MMHG | RESPIRATION RATE: 20 BRPM | SYSTOLIC BLOOD PRESSURE: 122 MMHG | OXYGEN SATURATION: 99 %

## 2023-12-25 DIAGNOSIS — M54.9 MUSCULOSKELETAL BACK PAIN: Primary | ICD-10-CM

## 2023-12-25 PROCEDURE — 99284 EMERGENCY DEPT VISIT MOD MDM: CPT | Performed by: EMERGENCY MEDICINE

## 2023-12-25 PROCEDURE — 96372 THER/PROPH/DIAG INJ SC/IM: CPT

## 2023-12-25 PROCEDURE — 99282 EMERGENCY DEPT VISIT SF MDM: CPT

## 2023-12-25 RX ORDER — KETOROLAC TROMETHAMINE 30 MG/ML
15 INJECTION, SOLUTION INTRAMUSCULAR; INTRAVENOUS ONCE
Status: COMPLETED | OUTPATIENT
Start: 2023-12-25 | End: 2023-12-25

## 2023-12-25 RX ORDER — METHOCARBAMOL 750 MG/1
750 TABLET, FILM COATED ORAL EVERY 6 HOURS PRN
Qty: 30 TABLET | Refills: 0 | Status: SHIPPED | OUTPATIENT
Start: 2023-12-25

## 2023-12-25 RX ORDER — KETOROLAC TROMETHAMINE 30 MG/ML
15 INJECTION, SOLUTION INTRAMUSCULAR; INTRAVENOUS ONCE
Status: DISCONTINUED | OUTPATIENT
Start: 2023-12-25 | End: 2023-12-25

## 2023-12-25 RX ORDER — METHOCARBAMOL 500 MG/1
750 TABLET, FILM COATED ORAL ONCE
Status: DISCONTINUED | OUTPATIENT
Start: 2023-12-25 | End: 2023-12-25 | Stop reason: HOSPADM

## 2023-12-25 RX ADMIN — KETOROLAC TROMETHAMINE 15 MG: 30 INJECTION, SOLUTION INTRAMUSCULAR; INTRAVENOUS at 10:26

## 2023-12-25 NOTE — ED PROVIDER NOTES
History  Chief Complaint   Patient presents with    Back Pain     Pt reports of lower back pain since Friday, was just walking around the garage and felt the pain then. Pt denies falling     Patient has a history of musculoskeletal back pain without sciatic component.  States she developed pain across the lower back while to simply walking on Friday 3 days prior to arrival.  Patient denies any injury to the area.  She denies any numbness or weakness in the lower extremities.  No bowel or bladder changes.  No hematuria.  States the pain is worse with certain position and movement.        Prior to Admission Medications   Prescriptions Last Dose Informant Patient Reported? Taking?   ALPRAZolam (XANAX) 0.25 mg tablet   Yes No   Sig: Take 0.25 mg by mouth daily at bedtime as needed for anxiety   Patient not taking: Reported on 2/16/2022    ALPRAZolam (XANAX) 0.5 mg tablet   Yes No   Sig: Take 0.5 mg by mouth daily at bedtime   buPROPion (WELLBUTRIN XL) 300 mg 24 hr tablet   Yes No   Sig: Take 300 mg by mouth daily   ibuprofen (MOTRIN) 600 mg tablet   Yes No   Sig: Take 600 mg by mouth 2 (two) times a day with meals      Facility-Administered Medications: None       No past medical history on file.    No past surgical history on file.    Family History   Problem Relation Age of Onset    No Known Problems Mother     No Known Problems Father     No Known Problems Sister     No Known Problems Brother     No Known Problems Maternal Aunt     No Known Problems Maternal Uncle     No Known Problems Paternal Aunt     No Known Problems Paternal Uncle     No Known Problems Maternal Grandmother     No Known Problems Maternal Grandfather     No Known Problems Paternal Grandmother     No Known Problems Paternal Grandfather     ADD / ADHD Neg Hx     Anesthesia problems Neg Hx     Cancer Neg Hx     Clotting disorder Neg Hx     Collagen disease Neg Hx     Diabetes Neg Hx     Dislocations Neg Hx     Learning disabilities Neg Hx      Neurological problems Neg Hx     Osteoporosis Neg Hx     Rheumatologic disease Neg Hx     Scoliosis Neg Hx     Vascular Disease Neg Hx      I have reviewed and agree with the history as documented.    E-Cigarette/Vaping    E-Cigarette Use Never User      E-Cigarette/Vaping Substances    Nicotine No     THC No     CBD No     Flavoring No     Other No     Unknown No      Social History     Tobacco Use    Smoking status: Never    Smokeless tobacco: Never   Vaping Use    Vaping status: Never Used   Substance Use Topics    Alcohol use: No    Drug use: No       Review of Systems   Constitutional:  Negative for chills and fever.   HENT:  Negative for congestion and sore throat.    Eyes:  Negative for visual disturbance.   Respiratory:  Negative for cough and shortness of breath.    Cardiovascular:  Negative for chest pain.   Gastrointestinal:  Negative for abdominal pain, nausea and vomiting.   Genitourinary:  Negative for decreased urine volume, dysuria and hematuria.   Musculoskeletal:  Positive for back pain. Negative for neck pain.   Skin:  Negative for rash.   Neurological:  Negative for weakness, numbness and headaches.   Hematological:  Does not bruise/bleed easily.   Psychiatric/Behavioral:  Negative for confusion.    All other systems reviewed and are negative.      Physical Exam  Physical Exam  Vitals and nursing note reviewed.   Constitutional:       Appearance: Normal appearance.   HENT:      Head: Normocephalic.      Right Ear: External ear normal.      Left Ear: External ear normal.      Nose: Nose normal.      Mouth/Throat:      Mouth: Mucous membranes are moist.   Eyes:      Conjunctiva/sclera: Conjunctivae normal.   Cardiovascular:      Rate and Rhythm: Normal rate.      Pulses: Normal pulses.   Pulmonary:      Effort: Pulmonary effort is normal.   Abdominal:      Palpations: Abdomen is soft.   Musculoskeletal:         General: Tenderness present. Normal range of motion.      Cervical back: Normal range of  motion.      Comments: Tenderness to palpation on the lower back extensors without spinal tenderness   Skin:     General: Skin is warm and dry.      Capillary Refill: Capillary refill takes less than 2 seconds.   Neurological:      General: No focal deficit present.      Mental Status: Dang is alert.      Motor: No weakness.   Psychiatric:         Mood and Affect: Mood normal.         Vital Signs  ED Triage Vitals [12/25/23 1000]   Temperature Pulse Respirations Blood Pressure SpO2   97.7 °F (36.5 °C) 78 20 122/89 99 %      Temp Source Heart Rate Source Patient Position - Orthostatic VS BP Location FiO2 (%)   Tympanic Monitor Sitting Left arm --      Pain Score       --           Vitals:    12/25/23 1000   BP: 122/89   Pulse: 78   Patient Position - Orthostatic VS: Sitting         Visual Acuity      ED Medications  Medications   methocarbamol (ROBAXIN) tablet 750 mg (has no administration in time range)   ketorolac (TORADOL) injection 15 mg (has no administration in time range)       Diagnostic Studies  Results Reviewed       None                   No orders to display              Procedures  Procedures         ED Course                                             Medical Decision Making  Musculoskeletal back pain without sciatica    Risk  Prescription drug management.             Disposition  Final diagnoses:   Musculoskeletal back pain     Time reflects when diagnosis was documented in both MDM as applicable and the Disposition within this note       Time User Action Codes Description Comment    12/25/2023 10:15 AM Roger Swann Add [M54.9] Musculoskeletal back pain           ED Disposition       ED Disposition   Discharge    Condition   Stable    Date/Time   Mon Dec 25, 2023 10:15 AM    Comment   Dang Vence discharge to home/self care.                   Follow-up Information       Follow up With Specialties Details Why Contact [facility] Schedule an appointment as soon as  possible for a visit   [address] [facility] [address]  [facility] [address]  [phone]              Patient's Medications   Discharge Prescriptions    METHOCARBAMOL (ROBAXIN-750) 750 MG TABLET    Take 1 tablet (750 mg total) by mouth every 6 (six) hours as needed for muscle spasms       Start Date: 12/25/2023End Date: --       Order Dose: 750 mg       Quantity: 30 tablet    Refills: 0       No discharge procedures on file.    PDMP Review       None            ED Provider  Electronically Signed by             Roger Swann MD  12/25/23 4933